# Patient Record
Sex: FEMALE | Race: ASIAN | NOT HISPANIC OR LATINO | Employment: UNEMPLOYED | URBAN - METROPOLITAN AREA
[De-identification: names, ages, dates, MRNs, and addresses within clinical notes are randomized per-mention and may not be internally consistent; named-entity substitution may affect disease eponyms.]

---

## 2020-06-22 ENCOUNTER — TRANSCRIBE ORDERS (OUTPATIENT)
Dept: OBSTETRICS AND GYNECOLOGY | Facility: HOSPITAL | Age: 40
End: 2020-06-22

## 2020-06-22 DIAGNOSIS — O09.519 AMA (ADVANCED MATERNAL AGE) PRIMIGRAVIDA 35+, UNSPECIFIED TRIMESTER: Primary | ICD-10-CM

## 2020-07-06 ENCOUNTER — HOSPITAL ENCOUNTER (OUTPATIENT)
Dept: WOMENS IMAGING | Facility: HOSPITAL | Age: 40
Discharge: HOME | End: 2020-07-06
Admitting: OBSTETRICS & GYNECOLOGY

## 2020-07-06 ENCOUNTER — OFFICE VISIT (OUTPATIENT)
Dept: OBSTETRICS AND GYNECOLOGY | Facility: HOSPITAL | Age: 40
End: 2020-07-06

## 2020-07-06 VITALS
HEIGHT: 65 IN | DIASTOLIC BLOOD PRESSURE: 67 MMHG | BODY MASS INDEX: 24.29 KG/M2 | SYSTOLIC BLOOD PRESSURE: 117 MMHG | WEIGHT: 145.8 LBS

## 2020-07-06 DIAGNOSIS — Z78.9 LANGUAGE BARRIER: ICD-10-CM

## 2020-07-06 DIAGNOSIS — O09.519 AMA (ADVANCED MATERNAL AGE) PRIMIGRAVIDA 35+, UNSPECIFIED TRIMESTER: ICD-10-CM

## 2020-07-06 DIAGNOSIS — O09.511 PRIMIGRAVIDA OF ADVANCED MATERNAL AGE IN FIRST TRIMESTER: Primary | ICD-10-CM

## 2020-07-06 PROCEDURE — 76813 OB US NUCHAL MEAS 1 GEST: CPT

## 2020-07-06 PROCEDURE — 76801 OB US < 14 WKS SINGLE FETUS: CPT

## 2020-07-06 PROCEDURE — 76801 OB US < 14 WKS SINGLE FETUS: CPT | Performed by: OBSTETRICS & GYNECOLOGY

## 2020-07-06 PROCEDURE — 99241 PR OFFICE CONSULTATION NEW/ESTAB PATIENT 15 MIN: CPT | Performed by: OBSTETRICS & GYNECOLOGY

## 2020-07-06 PROCEDURE — 76813 OB US NUCHAL MEAS 1 GEST: CPT | Performed by: OBSTETRICS & GYNECOLOGY

## 2020-07-06 RX ORDER — VITAMIN B COMPLEX
1 CAPSULE ORAL DAILY
COMMUNITY

## 2020-07-06 RX ORDER — UREA 10 %
800 LOTION (ML) TOPICAL DAILY
COMMUNITY
End: 2020-08-14

## 2020-07-06 RX ORDER — PRENATAL VIT NO.126/IRON/FOLIC 28MG-0.8MG
1 TABLET ORAL DAILY
COMMUNITY
Start: 2020-07-03

## 2020-07-06 NOTE — PROGRESS NOTES
Documentation of the ultasound findings, images, and interpretations as well as consultation note will be available in the patient's Viewpoint report located in the Chart Review Imaging tab in [facility].

## 2020-07-06 NOTE — PROGRESS NOTES
Denies problems. Unsure when next OB visit is. Does not read English but speaks limited English; was able to provide her medical history.

## 2020-07-30 ENCOUNTER — OFFICE VISIT (OUTPATIENT)
Dept: OBSTETRICS AND GYNECOLOGY | Facility: HOSPITAL | Age: 40
End: 2020-07-30

## 2020-07-30 ENCOUNTER — HOSPITAL ENCOUNTER (OUTPATIENT)
Dept: WOMENS IMAGING | Facility: HOSPITAL | Age: 40
Discharge: HOME | End: 2020-07-30
Admitting: OBSTETRICS & GYNECOLOGY

## 2020-07-30 ENCOUNTER — LAB (OUTPATIENT)
Dept: LAB | Facility: HOSPITAL | Age: 40
End: 2020-07-30

## 2020-07-30 VITALS — SYSTOLIC BLOOD PRESSURE: 114 MMHG | BODY MASS INDEX: 24.99 KG/M2 | WEIGHT: 149 LBS | DIASTOLIC BLOOD PRESSURE: 72 MMHG

## 2020-07-30 DIAGNOSIS — Z78.9 LANGUAGE BARRIER: ICD-10-CM

## 2020-07-30 DIAGNOSIS — O09.512 PRIMIGRAVIDA OF ADVANCED MATERNAL AGE IN SECOND TRIMESTER: Primary | ICD-10-CM

## 2020-07-30 DIAGNOSIS — O09.511 PRIMIGRAVIDA OF ADVANCED MATERNAL AGE IN FIRST TRIMESTER: ICD-10-CM

## 2020-07-30 LAB
ABO GROUP BLD: NORMAL
RH BLD: POSITIVE

## 2020-07-30 PROCEDURE — 59000 AMNIOCENTESIS DIAGNOSTIC: CPT

## 2020-07-30 PROCEDURE — 86900 BLOOD TYPING SEROLOGIC ABO: CPT | Performed by: OBSTETRICS & GYNECOLOGY

## 2020-07-30 PROCEDURE — 36415 COLL VENOUS BLD VENIPUNCTURE: CPT | Performed by: OBSTETRICS & GYNECOLOGY

## 2020-07-30 PROCEDURE — 59000 AMNIOCENTESIS DIAGNOSTIC: CPT | Performed by: OBSTETRICS & GYNECOLOGY

## 2020-07-30 PROCEDURE — 86901 BLOOD TYPING SEROLOGIC RH(D): CPT | Performed by: OBSTETRICS & GYNECOLOGY

## 2020-07-30 PROCEDURE — 76946 ECHO GUIDE FOR AMNIOCENTESIS: CPT | Performed by: OBSTETRICS & GYNECOLOGY

## 2020-07-30 PROCEDURE — 76946 ECHO GUIDE FOR AMNIOCENTESIS: CPT

## 2020-07-30 PROCEDURE — 99212 OFFICE O/P EST SF 10 MIN: CPT | Performed by: OBSTETRICS & GYNECOLOGY

## 2020-07-30 NOTE — PROGRESS NOTES
Documentation of the ultasound findings, images, and interpretations as well as consultation note will be available in the patient's Viewpoint report located in the Chart Review Imaging tab in [facility].

## 2020-08-11 ENCOUNTER — TELEPHONE (OUTPATIENT)
Dept: WOMENS IMAGING | Facility: HOSPITAL | Age: 40
End: 2020-08-11

## 2020-08-11 NOTE — TELEPHONE ENCOUNTER
Patient speaks limited English and has requested we contact her through her . Left voice message for him to call [facility] regarding amnio results. Dr. Hensley would like to see patient this week to discuss abnormal fetal chromosome analysis utilizing [facility] . He would like  to accompany her since maternal and paternal blood specimens for chromosome analysis to correlate with fetal results are recommended by the genetic lab.     Mr. Dash returned my call. Informed him of Dr. Hensley's requests as above given unusual findings on fetal chromosome analysis. He v/u and agrees. Appointment rescheduled from 8/27/2020 to 8/14/2020.

## 2020-08-14 ENCOUNTER — HOSPITAL ENCOUNTER (OUTPATIENT)
Dept: WOMENS IMAGING | Facility: HOSPITAL | Age: 40
Discharge: HOME | End: 2020-08-14
Admitting: OBSTETRICS & GYNECOLOGY

## 2020-08-14 ENCOUNTER — OFFICE VISIT (OUTPATIENT)
Dept: OBSTETRICS AND GYNECOLOGY | Facility: HOSPITAL | Age: 40
End: 2020-08-14

## 2020-08-14 ENCOUNTER — LAB (OUTPATIENT)
Dept: LAB | Facility: HOSPITAL | Age: 40
End: 2020-08-14

## 2020-08-14 VITALS — SYSTOLIC BLOOD PRESSURE: 110 MMHG | DIASTOLIC BLOOD PRESSURE: 63 MMHG | WEIGHT: 150.4 LBS | BODY MASS INDEX: 25.22 KG/M2

## 2020-08-14 DIAGNOSIS — Z78.9 LANGUAGE BARRIER: ICD-10-CM

## 2020-08-14 DIAGNOSIS — O09.512 PRIMIGRAVIDA OF ADVANCED MATERNAL AGE IN SECOND TRIMESTER: ICD-10-CM

## 2020-08-14 DIAGNOSIS — O35.10X0 CHROMOSOMAL ABNORMALITY IN FETUS, AFFECTING MANAGEMENT OF MOTHER, ANTEPARTUM, SINGLE OR UNSPECIFIED FETUS: Primary | ICD-10-CM

## 2020-08-14 PROCEDURE — 88230 TISSUE CULTURE LYMPHOCYTE: CPT

## 2020-08-14 PROCEDURE — 76811 OB US DETAILED SNGL FETUS: CPT

## 2020-08-14 PROCEDURE — 36415 COLL VENOUS BLD VENIPUNCTURE: CPT | Performed by: OBSTETRICS & GYNECOLOGY

## 2020-08-14 PROCEDURE — 76811 OB US DETAILED SNGL FETUS: CPT | Performed by: OBSTETRICS & GYNECOLOGY

## 2020-08-14 NOTE — PROGRESS NOTES
Documentation of the ultasound findings, images, and interpretations will be available in the patient's Viewpoint report located in the Chart Review Imaging tab in [facility].

## 2020-08-27 ENCOUNTER — APPOINTMENT (OUTPATIENT)
Dept: WOMENS IMAGING | Facility: HOSPITAL | Age: 40
End: 2020-08-27

## 2020-08-28 LAB — REF LAB TEST METHOD: NORMAL

## 2020-09-11 ENCOUNTER — HOSPITAL ENCOUNTER (OUTPATIENT)
Dept: WOMENS IMAGING | Facility: HOSPITAL | Age: 40
Discharge: HOME | End: 2020-09-11
Admitting: OBSTETRICS & GYNECOLOGY

## 2020-09-11 ENCOUNTER — OFFICE VISIT (OUTPATIENT)
Dept: OBSTETRICS AND GYNECOLOGY | Facility: HOSPITAL | Age: 40
End: 2020-09-11

## 2020-09-11 VITALS — WEIGHT: 155.8 LBS | SYSTOLIC BLOOD PRESSURE: 126 MMHG | BODY MASS INDEX: 26.13 KG/M2 | DIASTOLIC BLOOD PRESSURE: 64 MMHG

## 2020-09-11 DIAGNOSIS — Z78.9 LANGUAGE BARRIER: Primary | ICD-10-CM

## 2020-09-11 DIAGNOSIS — O09.512 PRIMIGRAVIDA OF ADVANCED MATERNAL AGE IN SECOND TRIMESTER: ICD-10-CM

## 2020-09-11 DIAGNOSIS — Z78.9 LANGUAGE BARRIER: ICD-10-CM

## 2020-09-11 PROCEDURE — 76816 OB US FOLLOW-UP PER FETUS: CPT | Performed by: OBSTETRICS & GYNECOLOGY

## 2020-09-11 PROCEDURE — 76816 OB US FOLLOW-UP PER FETUS: CPT

## 2020-09-11 PROCEDURE — 99213 OFFICE O/P EST LOW 20 MIN: CPT | Performed by: OBSTETRICS & GYNECOLOGY

## 2020-09-11 NOTE — PROGRESS NOTES
Documentation of the ultasound findings, images, and interpretations as well as consultation note will be available in the patient's Viewpoint report located in the Chart Review Imaging tab in [facility] [employer].

## 2020-09-11 NOTE — PROGRESS NOTES
Denies problems. Here to discuss abnormal amnio results. States is changing physicians. Does not have next appt scheduled.

## 2020-10-09 ENCOUNTER — OFFICE VISIT (OUTPATIENT)
Dept: OBSTETRICS AND GYNECOLOGY | Facility: HOSPITAL | Age: 40
End: 2020-10-09

## 2020-10-09 ENCOUNTER — HOSPITAL ENCOUNTER (OUTPATIENT)
Dept: WOMENS IMAGING | Facility: HOSPITAL | Age: 40
Discharge: HOME | End: 2020-10-09
Admitting: OBSTETRICS & GYNECOLOGY

## 2020-10-09 VITALS — DIASTOLIC BLOOD PRESSURE: 74 MMHG | WEIGHT: 156.4 LBS | SYSTOLIC BLOOD PRESSURE: 117 MMHG | BODY MASS INDEX: 26.23 KG/M2

## 2020-10-09 DIAGNOSIS — O35.10X0 CHROMOSOMAL ABNORMALITY IN FETUS, AFFECTING MANAGEMENT OF MOTHER, ANTEPARTUM, SINGLE OR UNSPECIFIED FETUS: ICD-10-CM

## 2020-10-09 DIAGNOSIS — O09.512 PRIMIGRAVIDA OF ADVANCED MATERNAL AGE IN SECOND TRIMESTER: Primary | ICD-10-CM

## 2020-10-09 DIAGNOSIS — O35.10X0: ICD-10-CM

## 2020-10-09 DIAGNOSIS — Z78.9 LANGUAGE BARRIER: ICD-10-CM

## 2020-10-09 PROCEDURE — 76825 ECHO EXAM OF FETAL HEART: CPT | Performed by: OBSTETRICS & GYNECOLOGY

## 2020-10-09 PROCEDURE — 76816 OB US FOLLOW-UP PER FETUS: CPT | Performed by: OBSTETRICS & GYNECOLOGY

## 2020-10-09 PROCEDURE — 99213 OFFICE O/P EST LOW 20 MIN: CPT | Performed by: OBSTETRICS & GYNECOLOGY

## 2020-10-09 PROCEDURE — 76825 ECHO EXAM OF FETAL HEART: CPT

## 2020-10-09 PROCEDURE — 93325 DOPPLER ECHO COLOR FLOW MAPG: CPT

## 2020-10-09 PROCEDURE — 93325 DOPPLER ECHO COLOR FLOW MAPG: CPT | Performed by: OBSTETRICS & GYNECOLOGY

## 2020-10-09 PROCEDURE — 76816 OB US FOLLOW-UP PER FETUS: CPT

## 2020-10-09 NOTE — PROGRESS NOTES
Denies problems. States has not seen Dr. Phan since 1st visit here. Plans transfer of care to a doctor in [place] with initial appointment next week.

## 2020-10-09 NOTE — PROGRESS NOTES
Patient seen in [facility] clinic today. Please see full note in under imaging tab of patient chart in [facility] (Viewpoint report).    Sumaya Oropeza MD

## 2020-11-20 ENCOUNTER — HOSPITAL ENCOUNTER (OUTPATIENT)
Dept: WOMENS IMAGING | Facility: HOSPITAL | Age: 40
Discharge: HOME | End: 2020-11-20
Admitting: OBSTETRICS & GYNECOLOGY

## 2020-11-20 ENCOUNTER — OFFICE VISIT (OUTPATIENT)
Dept: OBSTETRICS AND GYNECOLOGY | Facility: HOSPITAL | Age: 40
End: 2020-11-20

## 2020-11-20 VITALS — DIASTOLIC BLOOD PRESSURE: 73 MMHG | WEIGHT: 170.6 LBS | BODY MASS INDEX: 28.61 KG/M2 | SYSTOLIC BLOOD PRESSURE: 117 MMHG

## 2020-11-20 DIAGNOSIS — O35.10X0: Primary | ICD-10-CM

## 2020-11-20 DIAGNOSIS — O09.513 PRIMIGRAVIDA OF ADVANCED MATERNAL AGE IN THIRD TRIMESTER: ICD-10-CM

## 2020-11-20 DIAGNOSIS — Z34.90 PREGNANCY, UNSPECIFIED GESTATIONAL AGE: ICD-10-CM

## 2020-11-20 DIAGNOSIS — O09.512 PRIMIGRAVIDA OF ADVANCED MATERNAL AGE IN SECOND TRIMESTER: ICD-10-CM

## 2020-11-20 DIAGNOSIS — O35.10X0: ICD-10-CM

## 2020-11-20 PROCEDURE — 76816 OB US FOLLOW-UP PER FETUS: CPT

## 2020-11-20 PROCEDURE — 76816 OB US FOLLOW-UP PER FETUS: CPT | Performed by: OBSTETRICS & GYNECOLOGY

## 2020-11-20 NOTE — PROGRESS NOTES
States baby has always moved well at night but never much during the day. Has transferred prenatal care from Dr. Phan to Dr. Rasmussen. To see her next week.